# Patient Record
Sex: FEMALE | Race: WHITE | Employment: PART TIME | URBAN - METROPOLITAN AREA
[De-identification: names, ages, dates, MRNs, and addresses within clinical notes are randomized per-mention and may not be internally consistent; named-entity substitution may affect disease eponyms.]

---

## 2024-10-08 ENCOUNTER — APPOINTMENT (OUTPATIENT)
Dept: GENERAL RADIOLOGY | Facility: HOSPITAL | Age: 24
End: 2024-10-08
Attending: EMERGENCY MEDICINE

## 2024-10-08 ENCOUNTER — HOSPITAL ENCOUNTER (EMERGENCY)
Facility: HOSPITAL | Age: 24
Discharge: HOME | End: 2024-10-09
Attending: EMERGENCY MEDICINE

## 2024-10-08 ENCOUNTER — TELEPHONE (OUTPATIENT)
Dept: ENDOCRINOLOGY CLINIC | Facility: CLINIC | Age: 24
End: 2024-10-08

## 2024-10-08 DIAGNOSIS — E05.90 HYPERTHYROIDISM: Primary | ICD-10-CM

## 2024-10-08 LAB
ALBUMIN SERPL-MCNC: 4.2 G/DL (ref 3.2–4.8)
ALBUMIN/GLOB SERPL: 1.5 {RATIO} (ref 1–2)
ALP LIVER SERPL-CCNC: 198 U/L
ALT SERPL-CCNC: 52 U/L
ANION GAP SERPL CALC-SCNC: 6 MMOL/L (ref 0–18)
AST SERPL-CCNC: 46 U/L (ref ?–34)
B-HCG UR QL: NEGATIVE
BASOPHILS # BLD AUTO: 0.01 X10(3) UL (ref 0–0.2)
BASOPHILS NFR BLD AUTO: 0.2 %
BILIRUB SERPL-MCNC: 0.3 MG/DL (ref 0.3–1.2)
BILIRUB UR QL: NEGATIVE
BUN BLD-MCNC: 12 MG/DL (ref 9–23)
BUN/CREAT SERPL: 25.5 (ref 10–20)
CALCIUM BLD-MCNC: 9.5 MG/DL (ref 8.7–10.4)
CHLORIDE SERPL-SCNC: 110 MMOL/L (ref 98–112)
CO2 SERPL-SCNC: 24 MMOL/L (ref 21–32)
COLOR UR: YELLOW
CREAT BLD-MCNC: 0.47 MG/DL
DEPRECATED RDW RBC AUTO: 36.9 FL (ref 35.1–46.3)
EGFRCR SERPLBLD CKD-EPI 2021: 136 ML/MIN/1.73M2 (ref 60–?)
EOSINOPHIL # BLD AUTO: 0.05 X10(3) UL (ref 0–0.7)
EOSINOPHIL NFR BLD AUTO: 0.9 %
ERYTHROCYTE [DISTWIDTH] IN BLOOD BY AUTOMATED COUNT: 13.5 % (ref 11–15)
GLOBULIN PLAS-MCNC: 2.8 G/DL (ref 2–3.5)
GLUCOSE BLD-MCNC: 123 MG/DL (ref 70–99)
GLUCOSE UR-MCNC: NORMAL MG/DL
HCT VFR BLD AUTO: 33.6 %
HGB BLD-MCNC: 11 G/DL
IMM GRANULOCYTES # BLD AUTO: 0.01 X10(3) UL (ref 0–1)
IMM GRANULOCYTES NFR BLD: 0.2 %
LEUKOCYTE ESTERASE UR QL STRIP.AUTO: 25
LYMPHOCYTES # BLD AUTO: 2.7 X10(3) UL (ref 1–4)
LYMPHOCYTES NFR BLD AUTO: 47.3 %
MCH RBC QN AUTO: 24.9 PG (ref 26–34)
MCHC RBC AUTO-ENTMCNC: 32.7 G/DL (ref 31–37)
MCV RBC AUTO: 76 FL
MONOCYTES # BLD AUTO: 0.5 X10(3) UL (ref 0.1–1)
MONOCYTES NFR BLD AUTO: 8.8 %
NEUTROPHILS # BLD AUTO: 2.44 X10 (3) UL (ref 1.5–7.7)
NEUTROPHILS # BLD AUTO: 2.44 X10(3) UL (ref 1.5–7.7)
NEUTROPHILS NFR BLD AUTO: 42.6 %
NITRITE UR QL STRIP.AUTO: NEGATIVE
OSMOLALITY SERPL CALC.SUM OF ELEC: 291 MOSM/KG (ref 275–295)
PH UR: 5.5 [PH] (ref 5–8)
PLATELET # BLD AUTO: 314 10(3)UL (ref 150–450)
POTASSIUM SERPL-SCNC: 3.9 MMOL/L (ref 3.5–5.1)
PROT SERPL-MCNC: 7 G/DL (ref 5.7–8.2)
RBC # BLD AUTO: 4.42 X10(6)UL
SODIUM SERPL-SCNC: 140 MMOL/L (ref 136–145)
SP GR UR STRIP: 1.03 (ref 1–1.03)
T3FREE SERPL-MCNC: >20 PG/ML (ref 2.4–4.2)
T4 FREE SERPL-MCNC: 7 NG/DL (ref 0.8–1.7)
TSI SER-ACNC: 0.01 MIU/ML (ref 0.55–4.78)
UROBILINOGEN UR STRIP-ACNC: 3
WBC # BLD AUTO: 5.7 X10(3) UL (ref 4–11)

## 2024-10-08 PROCEDURE — 81001 URINALYSIS AUTO W/SCOPE: CPT

## 2024-10-08 PROCEDURE — 99284 EMERGENCY DEPT VISIT MOD MDM: CPT

## 2024-10-08 PROCEDURE — 87086 URINE CULTURE/COLONY COUNT: CPT | Performed by: EMERGENCY MEDICINE

## 2024-10-08 PROCEDURE — 81025 URINE PREGNANCY TEST: CPT

## 2024-10-08 PROCEDURE — 36415 COLL VENOUS BLD VENIPUNCTURE: CPT

## 2024-10-08 PROCEDURE — 99285 EMERGENCY DEPT VISIT HI MDM: CPT

## 2024-10-08 PROCEDURE — 84481 FREE ASSAY (FT-3): CPT | Performed by: EMERGENCY MEDICINE

## 2024-10-08 PROCEDURE — 71045 X-RAY EXAM CHEST 1 VIEW: CPT | Performed by: EMERGENCY MEDICINE

## 2024-10-08 PROCEDURE — 87086 URINE CULTURE/COLONY COUNT: CPT

## 2024-10-08 PROCEDURE — 80053 COMPREHEN METABOLIC PANEL: CPT | Performed by: EMERGENCY MEDICINE

## 2024-10-08 PROCEDURE — 80053 COMPREHEN METABOLIC PANEL: CPT

## 2024-10-08 PROCEDURE — 81001 URINALYSIS AUTO W/SCOPE: CPT | Performed by: EMERGENCY MEDICINE

## 2024-10-08 PROCEDURE — 93005 ELECTROCARDIOGRAM TRACING: CPT

## 2024-10-08 PROCEDURE — 85025 COMPLETE CBC W/AUTO DIFF WBC: CPT | Performed by: EMERGENCY MEDICINE

## 2024-10-08 PROCEDURE — 93010 ELECTROCARDIOGRAM REPORT: CPT

## 2024-10-08 PROCEDURE — 84443 ASSAY THYROID STIM HORMONE: CPT | Performed by: EMERGENCY MEDICINE

## 2024-10-08 PROCEDURE — 85025 COMPLETE CBC W/AUTO DIFF WBC: CPT

## 2024-10-08 PROCEDURE — 84439 ASSAY OF FREE THYROXINE: CPT | Performed by: EMERGENCY MEDICINE

## 2024-10-08 RX ORDER — METOPROLOL TARTRATE 50 MG
50 TABLET ORAL 2 TIMES DAILY
COMMUNITY

## 2024-10-08 RX ORDER — METOPROLOL TARTRATE 50 MG
50 TABLET ORAL ONCE
Status: COMPLETED | OUTPATIENT
Start: 2024-10-08 | End: 2024-10-08

## 2024-10-09 VITALS
DIASTOLIC BLOOD PRESSURE: 78 MMHG | OXYGEN SATURATION: 100 % | RESPIRATION RATE: 28 BRPM | SYSTOLIC BLOOD PRESSURE: 122 MMHG | WEIGHT: 143.31 LBS | HEART RATE: 107 BPM | TEMPERATURE: 99 F

## 2024-10-09 LAB
ATRIAL RATE: 128 BPM
P AXIS: 50 DEGREES
P-R INTERVAL: 128 MS
Q-T INTERVAL: 302 MS
QRS DURATION: 78 MS
QTC CALCULATION (BEZET): 440 MS
R AXIS: 60 DEGREES
T AXIS: 32 DEGREES
VENTRICULAR RATE: 128 BPM

## 2024-10-09 RX ORDER — METHIMAZOLE 10 MG/1
20 TABLET ORAL 2 TIMES DAILY
Qty: 120 TABLET | Refills: 0 | Status: SHIPPED | OUTPATIENT
Start: 2024-10-09 | End: 2024-11-08

## 2024-10-09 RX ORDER — METOPROLOL TARTRATE 50 MG
50 TABLET ORAL 2 TIMES DAILY
Qty: 60 TABLET | Refills: 0 | Status: SHIPPED | OUTPATIENT
Start: 2024-10-09 | End: 2024-11-08

## 2024-10-09 NOTE — ED PROVIDER NOTES
Patient Seen in: [facility] Emergency Department      History     Chief Complaint   Patient presents with    Abdomen/Flank Pain    Arrythmia/Palpitations     Stated Complaint: abd pain, fever, N/V/D    Subjective:   HPI    24-year-old female with history of recently diagnosed hyperthyroidism presents with complaints of nausea, vomiting, diarrhea, abdominal pain, subjective fever, palpitations, and difficulty sleeping.  The patient reports symptoms over the past 2 weeks.  She was hospitalized in [place] in mid June and was diagnosed with hyperthyroidism.  She was discharged home on metoprolol 50 mg twice daily.  She has not been taking the medication regularly as she states it causes insomnia.  She complains of diffuse abdominal pain along with intermittent vomiting and diarrhea.  No measured fevers at home.  She denies any cough or dyspnea.  History is obtained through a language line  as the patient does not speak English.    Objective:     Past Medical History:    Hyperthyroidism    Tachycardia              History reviewed. No pertinent surgical history.             Social History     Socioeconomic History    Marital status: Single   Tobacco Use    Smoking status: Never    Smokeless tobacco: Never   Substance and Sexual Activity    Alcohol use: Never    Drug use: Never                  Physical Exam     ED Triage Vitals [10/08/24 2119]   /85   Pulse (!) 123   Resp 18   Temp 98.5 °F (36.9 °C)   Temp src Oral   SpO2 98 %   O2 Device None (Room air)       Current Vitals:   Vital Signs  BP: 135/85  Pulse: 111  Resp: 25  Temp: 98.5 °F (36.9 °C)  Temp src: Oral    Oxygen Therapy  SpO2: 99 %  O2 Device: None (Room air)        Physical Exam    General Appearance: alert, no distress  Eyes: pupils equal and round no pallor or injection.  Mild exophthalmos noted.  ENT, Mouth: mucous membranes moist  Respiratory: there are no retractions, lungs are clear to auscultation  Cardiovascular:  Tachycardic, regular rhythm  Gastrointestinal:  abdomen is soft with mild, diffuse tenderness to palpation, no masses, bowel sounds normal  Neurological: Speech normal.  Moving extremities x 4.  Skin: warm and dry, no rashes.  Musculoskeletal: neck is supple non tender        Extremities are symmetrical, full range of motion.  No leg edema or tenderness noted.  Psychiatric: patient is oriented X 3, there is no agitation    DIFFERENTIAL DIAGNOSIS: After history and physical exam differential diagnosis was considered for thyrotoxicosis, thyroid storm, or other        ED Course     Labs Reviewed   URINALYSIS WITH CULTURE REFLEX - Abnormal; Notable for the following components:       Result Value    Clarity Urine Turbid (*)     Ketones Urine Trace (*)     Blood Urine Trace (*)     Protein Urine Trace (*)     Urobilinogen Urine 3 (*)     Leukocyte Esterase Urine 25 (*)     Squamous Epi. Cells Few (*)     All other components within normal limits   COMP METABOLIC PANEL (14) - Abnormal; Notable for the following components:    Glucose 123 (*)     Creatinine 0.47 (*)     BUN/CREA Ratio 25.5 (*)     ALT 52 (*)     AST 46 (*)     Alkaline Phosphatase 198 (*)     All other components within normal limits   CBC WITH DIFFERENTIAL WITH PLATELET - Abnormal; Notable for the following components:    HGB 11.0 (*)     HCT 33.6 (*)     MCV 76.0 (*)     MCH 24.9 (*)     All other components within normal limits   TSH W REFLEX TO FREE T4 - Abnormal; Notable for the following components:    TSH 0.009 (*)     All other components within normal limits   T4, FREE (S) - Abnormal; Notable for the following components:    Free T4 7.0 (*)     All other components within normal limits   FREE T3 (TRIIODOTHYRONINE) - Abnormal; Notable for the following components:    T3 Free >20.00 (*)     All other components within normal limits   POCT PREGNANCY URINE - Normal   URINE CULTURE, ROUTINE     EKG    Rate, intervals and axes as noted on EKG  Report.  Rate: 128  Rhythm: Sinus Rhythm  Axis: Normal  Reading: Sinus tachycardia, otherwise normal EKG                       MDM      Heart rate improved post metoprolol.  Patient remains comfortable appearing.    Chest x-ray was reviewed independently by me.  No pulmonary edema noted.  Lab results noted.  Discussed with Dr. John, endocrinology.  She recommends continuing metoprolol and adding methimazole as prescribed.  She will follow-up with the patient and her clinic for further treatment.  Will also give primary care follow-up.  The patient is advised to return if worsening symptoms develop.                    Medical Decision Making      Disposition and Plan     Clinical Impression:  1. Hyperthyroidism         Disposition:  Discharge  10/9/2024 12:14 am    Follow-up:  Keren John MD  [address] [address]  [phone]    Follow up      Colin Underwood MD  [address] [id number] [phone]    Follow up            Medications Prescribed:  Current Discharge Medication List        START taking these medications    Details   methIMAzole 10 MG Oral Tab Take 2 tablets (20 mg total) by mouth in the morning and 2 tablets (20 mg total) before bedtime.  Qty: 120 tablet, Refills: 0      !! metoprolol tartrate 50 MG Oral Tab Take 1 tablet (50 mg total) by mouth 2 (two) times daily.  Qty: 60 tablet, Refills: 0       !! - Potential duplicate medications found. Please discuss with provider.              Supplementary Documentation:

## 2024-10-09 NOTE — TELEPHONE ENCOUNTER
Hi!  Please contact patient and set up new consult in 6 weeks with repeat labs to be completed before appt. It should be a 30 minute appt if possible. Thank you!

## 2024-10-09 NOTE — ED INITIAL ASSESSMENT (HPI)
Pt presents to ED with multiple complaints. Pt  c/o of abdominal  pain x 15 days, palpitations, loss of appetite, and nauseas . Pt Denies CP.   Pt states she was in a hospital in [place] and was diagnosed with hyperthyroidism and tachycardia and was prescribed metoprolol. Pt states she is taking her meds but not as she should because it gives her insomnia.   Pt states she also had a  + pregnancy  test at home.

## 2024-10-09 NOTE — ED QUICK NOTES
Patient approved for discharge per emergency department provider. patient given verbal and written discharge instructions. Given instructions on rx x 2, follow up with endocrinology, and symptoms that necessitate coming back to the emergency department. Verbalizes understanding of discharge instructions.     Patient aox 3 out of ED with steady gait. Resp unlabored

## 2024-10-09 NOTE — DISCHARGE INSTRUCTIONS
Continue metoprolol as previously prescribed.  Take methimazole as prescribed.  Follow-up with endocrinology as discussed.  Return to the emergency department if repeated vomiting, worsening palpitations, difficulty breathing, or other new symptoms develop.

## 2024-10-10 NOTE — TELEPHONE ENCOUNTER
Spoke with Dinora, pt's life partner, pt is scheduled for 11/21/2024 at 915 am in for thyroid consult and message was left for pt to have repeat blood work before appointment. Dinora understood and address and location was confirmed and they had no additional questions or concerns at this time.

## 2024-11-21 ENCOUNTER — OFFICE VISIT (OUTPATIENT)
Dept: ENDOCRINOLOGY CLINIC | Facility: CLINIC | Age: 24
End: 2024-11-21

## 2024-11-21 ENCOUNTER — TELEPHONE (OUTPATIENT)
Dept: ENDOCRINOLOGY CLINIC | Facility: CLINIC | Age: 24
End: 2024-11-21

## 2024-11-21 VITALS
WEIGHT: 160 LBS | HEART RATE: 135 BPM | SYSTOLIC BLOOD PRESSURE: 134 MMHG | HEIGHT: 62.99 IN | BODY MASS INDEX: 28.35 KG/M2 | DIASTOLIC BLOOD PRESSURE: 91 MMHG

## 2024-11-21 DIAGNOSIS — Z34.90 PREGNANCY, UNSPECIFIED GESTATIONAL AGE (HCC): ICD-10-CM

## 2024-11-21 DIAGNOSIS — E05.90 HYPERTHYROIDISM: Primary | ICD-10-CM

## 2024-11-21 PROCEDURE — 99244 OFF/OP CNSLTJ NEW/EST MOD 40: CPT | Performed by: INTERNAL MEDICINE

## 2024-11-21 NOTE — H&P
Name: Latia Cross  Date: 11/21/2024    Referring Physician: No ref. provider found    Chief Complaint   Patient presents with    Consult     Hyperthyroidism, last labs        HISTORY OF PRESENT ILLNESS   Latia Cross is a 24 year old female who presents for   Chief Complaint   Patient presents with    Consult     Hyperthyroidism, last labs      This is a 24 year-old woman here for evaluation and management of hyperthyroidism. She had come to the ER about 6 weeks ago with signs of tachycardia, anxiety, and menstrual irregularities.     Throat Pain- none now, she did have this before    Family history of thyroid cancer/ conditions- none  Personal history of radiation to the head or neck- none    Medical History:   Past Medical History:    Hyperthyroidism    Tachycardia       Surgical History:   No past surgical history on file.    Family History:  No family history on file.    Social History:   Social History     Socioeconomic History    Marital status: Single   Tobacco Use    Smoking status: Never    Smokeless tobacco: Never   Vaping Use    Vaping status: Never Used   Substance and Sexual Activity    Alcohol use: Never    Drug use: Never       Medications:     Current Outpatient Medications:     metoprolol tartrate 50 MG Oral Tab, Take 1 tablet (50 mg total) by mouth 2 (two) times daily., Disp: , Rfl:      Allergies:   Allergies[1]    REVIEW OF SYSTEMS  Eyes: no change in vision  Neurologic: no headache, generalized or focal weakness or numbness.  Head: normal  ENT: normal  Lungs: no shortness of breath, wheezing or ABDUL  Cardiovascular:  no chest pain or palpitations  Gastrointestinal:  no abdominal pain, bowel movement problems  Musculoskeletal: no muscle pain or arthralgia  /Gyne: no frequency or discomfort while urinating  Psychiatric:  no acute distress, anxiety  or depression  Skin: normal moisturized skin    PHYSICAL EXAMINATION:  BP (!) 134/91   Pulse (!) 135   Ht 5' 2.99\"  (1.6 m)   Wt 160 lb (72.6 kg)   LMP  (LMP Unknown)   BMI 28.35 kg/m²     General Appearance:  Alert, in no acute distress, well developed  Eyes: normal conjunctivae, sclera.  Ears/Nose/Mouth/Throat/Neck:  normal hearing, normal speech and normal thyroid gland  Neck: Trachea midline  Neurologic: sensory grossly intact and motor grossly intact  Psychiatric:  oriented to time, self, and place  Nutritional:  no abnormal weight gain or loss    Labs:  Date  TSH  FT4 FT3    10/08/24 0.009  7.0 >20.00      Imaging:  None    ASSESSMENT/PLAN:  This is a 24 year-old woman here for evaluation and management of hyperthyroidism, cause unknown. She states that she is pregnant.    1. 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    -  Patient is aware of the following:  They will need life long replacement with levothyroxine after surgery and possibly after AUGUST  AUGUST: Risks/benefits explained to pt and questions answered. Patient is not pregnant, breastfeeding. No renal or hepatic insufficiency.  Pt understands the following risks:  - Risk for thyroiditis (1% of cases). if mild neck pain, pt to take Ibuprofen OTC; if severe pain, swelling, CP, or SOB, pt to go to ER.  - Risk for hypothyroidism: Most pts who receive radioactive iodine become hypothyroid and will need to take thyroid hormone supplements for the rest of their lives.  - Risk of recurrence of disease explained and may need further treatment; about 20% of those who receive AUGUST require a 2nd dose.  - Risk of worsening ophthalmopathy explained and instructed to immediately come to the clinic.  - Pt to avoid close physical contact, especially w/ young children and pregnant women for at least 3 days after AUGUST  treatment due to the possibility of exposing them to low doses of radiation.   - Pt instructed and knows the risk to pregnancy for the next 6 months after receiving AUGUST.    ATD:  Stop MMI  Continue metoprolol    - Side effects discussed:  > pruritus, rash, urticaria, arthralgias, arthritis, fever, abnormal taste sensation, nausea, or vomiting in up to 13 percent of patients   > agranulocytosis (prevalence of 0.1 to 0.5 %, and usually occurs within the first two months of treatment, but may occur later)  > congenital malformations associated with methimazole : fetal scalp defect, aplasia cutis , choanal atresia, tracheoesophageal fistulas   > Hepatotoxicity  Discussed and the patient knows that she should stop MMI and come to the ER/call the office if she experiences sore throat, fever, jaundice, dark urine, light stools, abdominal pain, anorexia, nausea, rash or joint pains,  - Patient understands that she needs 18 months of therapy with methimazole and that the frequency of prolonged remission among patients treated with methimazole for one to two years varies from 15 to 80 percent, but is usually 20 to 30 percent in the [place] and is more likely in patients with milder disease. During treatment, the dose will need to be adjusted  every 2 to 3 months to avoid hyperthyrroidism/hypothyroidism  - Patient doesn’t smoke/Smoking cessation discussed. Patient understands that smoking can aggravate orbitopathy    - Check TSH, FT4, FT3, CMP, CBC with diff, TSI and pregnancy test and start PTU according to current test results    Return to clinic in 6 weeks    Prior to this encounter, I spent over 20 minutes with preparing for the visit, reviewing documents from other specialties as well as from PCP, and going over test results and imaging studies. During the face to face encounter, I spent an additional 30 minutes which were determined for history-taking, physical examination, and orientation regarding our services.  Greater than 50% of the time was spent in counseling, anticipatory guidance, and coordination of care. Patient concerns were answered to the best of my knowledge.         11/21/2024  Keren John MD         [1] No Known Allergies

## 2024-11-21 NOTE — TELEPHONE ENCOUNTER
ALEXIS via  Jeremiah #[phone] to call clinic to relay message:  Dr. John has ordered labs to be completed as soon as possible - let us know if using another lab so orders can be faxed there  Patient does not have insurance - can contact [facility] (phone #[phone]): We provide high quality primary medical, dental, and behavioral healthcare to all, regardless of ability to pay or immigration status.       EC phone # does not have voicemail set up

## 2025-01-12 ENCOUNTER — APPOINTMENT (OUTPATIENT)
Dept: CT IMAGING | Facility: HOSPITAL | Age: 25
End: 2025-01-12
Attending: EMERGENCY MEDICINE

## 2025-01-12 ENCOUNTER — HOSPITAL ENCOUNTER (EMERGENCY)
Facility: HOSPITAL | Age: 25
Discharge: HOME | End: 2025-01-12
Attending: EMERGENCY MEDICINE

## 2025-01-12 VITALS
SYSTOLIC BLOOD PRESSURE: 122 MMHG | HEIGHT: 63 IN | TEMPERATURE: 98 F | BODY MASS INDEX: 26.58 KG/M2 | WEIGHT: 150 LBS | HEART RATE: 105 BPM | RESPIRATION RATE: 18 BRPM | DIASTOLIC BLOOD PRESSURE: 53 MMHG | OXYGEN SATURATION: 97 %

## 2025-01-12 DIAGNOSIS — J98.59 MEDIASTINAL MASS: ICD-10-CM

## 2025-01-12 DIAGNOSIS — G89.29 CHRONIC NONINTRACTABLE HEADACHE, UNSPECIFIED HEADACHE TYPE: ICD-10-CM

## 2025-01-12 DIAGNOSIS — R51.9 CHRONIC NONINTRACTABLE HEADACHE, UNSPECIFIED HEADACHE TYPE: ICD-10-CM

## 2025-01-12 DIAGNOSIS — L25.9 CONTACT DERMATITIS, UNSPECIFIED CONTACT DERMATITIS TYPE, UNSPECIFIED TRIGGER: Primary | ICD-10-CM

## 2025-01-12 DIAGNOSIS — R04.0 EPISTAXIS: ICD-10-CM

## 2025-01-12 LAB
ALBUMIN SERPL-MCNC: 4.4 G/DL (ref 3.2–4.8)
ALBUMIN/GLOB SERPL: 1.3 {RATIO} (ref 1–2)
ALP LIVER SERPL-CCNC: 270 U/L
ALT SERPL-CCNC: 58 U/L
ANION GAP SERPL CALC-SCNC: 9 MMOL/L (ref 0–18)
AST SERPL-CCNC: 53 U/L (ref ?–34)
B-HCG UR QL: NEGATIVE
BASOPHILS # BLD AUTO: 0.01 X10(3) UL (ref 0–0.2)
BASOPHILS NFR BLD AUTO: 0.1 %
BILIRUB SERPL-MCNC: 0.4 MG/DL (ref 0.3–1.2)
BILIRUB UR QL: NEGATIVE
BUN BLD-MCNC: 18 MG/DL (ref 9–23)
BUN/CREAT SERPL: 52.9 (ref 10–20)
CALCIUM BLD-MCNC: 9.9 MG/DL (ref 8.7–10.4)
CHLORIDE SERPL-SCNC: 108 MMOL/L (ref 98–112)
CO2 SERPL-SCNC: 24 MMOL/L (ref 21–32)
COLOR UR: YELLOW
CREAT BLD-MCNC: 0.34 MG/DL
DEPRECATED RDW RBC AUTO: 36.5 FL (ref 35.1–46.3)
EGFRCR SERPLBLD CKD-EPI 2021: 147 ML/MIN/1.73M2 (ref 60–?)
EOSINOPHIL # BLD AUTO: 0.07 X10(3) UL (ref 0–0.7)
EOSINOPHIL NFR BLD AUTO: 0.9 %
ERYTHROCYTE [DISTWIDTH] IN BLOOD BY AUTOMATED COUNT: 13.6 % (ref 11–15)
GLOBULIN PLAS-MCNC: 3.5 G/DL (ref 2–3.5)
GLUCOSE BLD-MCNC: 121 MG/DL (ref 70–99)
GLUCOSE UR-MCNC: NORMAL MG/DL
HCT VFR BLD AUTO: 38.3 %
HGB BLD-MCNC: 12.2 G/DL
IMM GRANULOCYTES # BLD AUTO: 0.01 X10(3) UL (ref 0–1)
IMM GRANULOCYTES NFR BLD: 0.1 %
LEUKOCYTE ESTERASE UR QL STRIP.AUTO: 75
LYMPHOCYTES # BLD AUTO: 4 X10(3) UL (ref 1–4)
LYMPHOCYTES NFR BLD AUTO: 51.7 %
MCH RBC QN AUTO: 24.1 PG (ref 26–34)
MCHC RBC AUTO-ENTMCNC: 31.9 G/DL (ref 31–37)
MCV RBC AUTO: 75.7 FL
MONOCYTES # BLD AUTO: 0.65 X10(3) UL (ref 0.1–1)
MONOCYTES NFR BLD AUTO: 8.4 %
NEUTROPHILS # BLD AUTO: 3 X10 (3) UL (ref 1.5–7.7)
NEUTROPHILS # BLD AUTO: 3 X10(3) UL (ref 1.5–7.7)
NEUTROPHILS NFR BLD AUTO: 38.8 %
NITRITE UR QL STRIP.AUTO: NEGATIVE
OSMOLALITY SERPL CALC.SUM OF ELEC: 295 MOSM/KG (ref 275–295)
PH UR: 5.5 [PH] (ref 5–8)
PLATELET # BLD AUTO: 403 10(3)UL (ref 150–450)
POTASSIUM SERPL-SCNC: 4.3 MMOL/L (ref 3.5–5.1)
PROT SERPL-MCNC: 7.9 G/DL (ref 5.7–8.2)
PROT UR-MCNC: 30 MG/DL
RBC # BLD AUTO: 5.06 X10(6)UL
SODIUM SERPL-SCNC: 141 MMOL/L (ref 136–145)
SP GR UR STRIP: >1.03 (ref 1–1.03)
T4 FREE SERPL-MCNC: 5.8 NG/DL (ref 0.8–1.7)
TSI SER-ACNC: <0.01 UIU/ML (ref 0.55–4.78)
UROBILINOGEN UR STRIP-ACNC: 2
WBC # BLD AUTO: 7.7 X10(3) UL (ref 4–11)

## 2025-01-12 PROCEDURE — 84439 ASSAY OF FREE THYROXINE: CPT | Performed by: EMERGENCY MEDICINE

## 2025-01-12 PROCEDURE — 99285 EMERGENCY DEPT VISIT HI MDM: CPT

## 2025-01-12 PROCEDURE — 96374 THER/PROPH/DIAG INJ IV PUSH: CPT

## 2025-01-12 PROCEDURE — S0028 INJECTION, FAMOTIDINE, 20 MG: HCPCS | Performed by: EMERGENCY MEDICINE

## 2025-01-12 PROCEDURE — 70498 CT ANGIOGRAPHY NECK: CPT | Performed by: EMERGENCY MEDICINE

## 2025-01-12 PROCEDURE — 70496 CT ANGIOGRAPHY HEAD: CPT | Performed by: EMERGENCY MEDICINE

## 2025-01-12 PROCEDURE — 84443 ASSAY THYROID STIM HORMONE: CPT | Performed by: EMERGENCY MEDICINE

## 2025-01-12 PROCEDURE — 99284 EMERGENCY DEPT VISIT MOD MDM: CPT

## 2025-01-12 PROCEDURE — 81025 URINE PREGNANCY TEST: CPT

## 2025-01-12 PROCEDURE — 81001 URINALYSIS AUTO W/SCOPE: CPT | Performed by: EMERGENCY MEDICINE

## 2025-01-12 PROCEDURE — 96361 HYDRATE IV INFUSION ADD-ON: CPT

## 2025-01-12 PROCEDURE — 96375 TX/PRO/DX INJ NEW DRUG ADDON: CPT

## 2025-01-12 PROCEDURE — 80053 COMPREHEN METABOLIC PANEL: CPT | Performed by: EMERGENCY MEDICINE

## 2025-01-12 PROCEDURE — 85025 COMPLETE CBC W/AUTO DIFF WBC: CPT | Performed by: EMERGENCY MEDICINE

## 2025-01-12 RX ORDER — PREDNISONE 20 MG/1
40 TABLET ORAL DAILY
Qty: 14 TABLET | Refills: 0 | Status: SHIPPED | OUTPATIENT
Start: 2025-01-12 | End: 2025-01-19

## 2025-01-12 RX ORDER — METOCLOPRAMIDE HYDROCHLORIDE 5 MG/ML
5 INJECTION INTRAMUSCULAR; INTRAVENOUS ONCE
Status: COMPLETED | OUTPATIENT
Start: 2025-01-12 | End: 2025-01-12

## 2025-01-12 RX ORDER — HYDROXYZINE HYDROCHLORIDE 25 MG/1
TABLET, FILM COATED ORAL NIGHTLY PRN
Qty: 14 TABLET | Refills: 0 | Status: SHIPPED | OUTPATIENT
Start: 2025-01-12

## 2025-01-12 RX ORDER — FAMOTIDINE 10 MG/ML
20 INJECTION, SOLUTION INTRAVENOUS ONCE
Status: COMPLETED | OUTPATIENT
Start: 2025-01-12 | End: 2025-01-12

## 2025-01-12 RX ORDER — FAMOTIDINE 20 MG/1
20 TABLET, FILM COATED ORAL 2 TIMES DAILY PRN
Qty: 30 TABLET | Refills: 0 | Status: SHIPPED | OUTPATIENT
Start: 2025-01-12 | End: 2025-02-11

## 2025-01-12 RX ORDER — METHYLPREDNISOLONE SODIUM SUCCINATE 125 MG/2ML
125 INJECTION INTRAMUSCULAR; INTRAVENOUS ONCE
Status: COMPLETED | OUTPATIENT
Start: 2025-01-12 | End: 2025-01-12

## 2025-01-12 RX ORDER — LORATADINE 10 MG/1
10 TABLET ORAL DAILY
Qty: 7 TABLET | Refills: 0 | Status: SHIPPED | OUTPATIENT
Start: 2025-01-12 | End: 2025-01-19

## 2025-01-12 RX ORDER — DIPHENHYDRAMINE HYDROCHLORIDE 50 MG/ML
25 INJECTION INTRAMUSCULAR; INTRAVENOUS ONCE
Status: COMPLETED | OUTPATIENT
Start: 2025-01-12 | End: 2025-01-12

## 2025-01-12 NOTE — ED INITIAL ASSESSMENT (HPI)
Patient arrives to the ED with c/o rash that started 2 weeks ago. States she's also been having on and off nose bleeds and headaches for months. Hx thyroid disease and tachycardia.

## 2025-01-13 NOTE — ED QUICK NOTES
Language line #[id number] used by MD and RN for discharge instructions. Reviewed actions and side effects of discharge medication and importance of MD follow up. Pt verbalized understanding.

## 2025-01-13 NOTE — DISCHARGE INSTRUCTIONS
Prednisone as prescribed. Use a humidifier in your room to help prevent nosebleeds. Atarax for itching at night and to aid in sleep. Claritin daily for the next week. Pepcid twice a day for the next 7 days.    Your CT shows no aneurysm or abnormality in brain tissue. There is fullness in the area of your upper chest behind your sternum which may just be residual thymus tissue but you will likely need further testing to confirm. You can see any of the Dr's above to help coordinate follow up testing.

## 2025-01-13 NOTE — ED PROVIDER NOTES
Patient Seen in: [facility] Emergency Department      History     Chief Complaint   Patient presents with    Nose Bleed    Rash Skin Problem     Stated Complaint: HA & epistaxis, allergic rxn/rash to whole body x1 week    Subjective:   HPI      Of note, all interactions with the patient including history taking, physical exam and discharge instructions were performed with the assistance of telemetry language line .    Patient presents the emergency department with multiple complaints.  She states that she has had a rash on her hands and her feet which is progressed over the last several weeks now to whole body itching.  She is unsure of an exposure.  She does use soap at work which she thinks she may be allergic to.  She also has had headaches for greater than a year which have been escalating as time is gone by most specifically over the last month.  There is no fever, visual disturbance, neck pain or neck stiffness.  There is no other aggravating or alleviating factors.  She describes in the diffuse and also at the base of her skull.  There is no weakness or numbness in arms or legs.  She also states that she intermittently gets nosebleeds specifically when she inserts her fingers in her nose.  She cannot recall the last time she had a nosebleed.  She has a history of tachycardia and hyperthyroidism for which she takes medication.    Objective:     Past Medical History:    Hyperthyroidism    Tachycardia              History reviewed. No pertinent surgical history.             Social History     Socioeconomic History    Marital status: Single   Tobacco Use    Smoking status: Never    Smokeless tobacco: Never   Vaping Use    Vaping status: Never Used   Substance and Sexual Activity    Alcohol use: Never    Drug use: Never                  Physical Exam     ED Triage Vitals [01/12/25 1754]   /78   Pulse (!) 122   Resp 18   Temp 98.4 °F (36.9 °C)   Temp src Oral   SpO2 98 %   O2 Device None  (Room air)       Current Vitals:   Vital Signs  BP: 122/53  Pulse: 105  Resp: 18  Temp: 98.4 °F (36.9 °C)  Temp src: Oral  MAP (mmHg): 70    Oxygen Therapy  SpO2: 97 %  O2 Device: None (Room air)        Physical Exam  Vitals and nursing note reviewed.   Constitutional:       General: She is not in acute distress.     Appearance: She is well-developed.   HENT:      Head: Normocephalic.      Right Ear: Tympanic membrane and ear canal normal.      Left Ear: Tympanic membrane and ear canal normal.      Nose: Nose normal.      Mouth/Throat:      Mouth: Mucous membranes are moist.   Eyes:      Conjunctiva/sclera: Conjunctivae normal.   Cardiovascular:      Rate and Rhythm: Normal rate and regular rhythm.      Heart sounds: No murmur heard.  Pulmonary:      Effort: Pulmonary effort is normal. No respiratory distress.      Breath sounds: Normal breath sounds.   Abdominal:      General: There is no distension.      Palpations: Abdomen is soft.      Tenderness: There is no abdominal tenderness.   Musculoskeletal:         General: No tenderness. Normal range of motion.      Cervical back: Normal range of motion and neck supple. No rigidity or tenderness.   Skin:     Capillary Refill: Capillary refill takes less than 2 seconds.      Comments: There is a generalized rash worse over the hands and feet with maculopapular component but no petechiae or purpura.   Neurological:      General: No focal deficit present.      Mental Status: She is alert and oriented to person, place, and time.      Cranial Nerves: No cranial nerve deficit.      Sensory: No sensory deficit.      Motor: No weakness.      Coordination: Coordination normal.      Gait: Gait normal.      Deep Tendon Reflexes: Reflexes normal.             ED Course     Labs Reviewed   CBC WITH DIFFERENTIAL WITH PLATELET - Abnormal; Notable for the following components:       Result Value    MCV 75.7 (*)     MCH 24.1 (*)     All other components within normal limits   COMP  METABOLIC PANEL (14) - Abnormal; Notable for the following components:    Glucose 121 (*)     Creatinine 0.34 (*)     BUN/CREA Ratio 52.9 (*)     ALT 58 (*)     AST 53 (*)     Alkaline Phosphatase 270 (*)     All other components within normal limits   URINALYSIS, ROUTINE - Abnormal; Notable for the following components:    Clarity Urine Turbid (*)     Spec Gravity >1.030 (*)     Ketones Urine Trace (*)     Blood Urine Trace (*)     Protein Urine 30 (*)     Urobilinogen Urine 2 (*)     Leukocyte Esterase Urine 75 (*)     WBC Urine 11-20 (*)     RBC Urine 6-10 (*)     Squamous Epi. Cells Moderate (*)     All other components within normal limits   TSH W REFLEX TO FREE T4 - Abnormal; Notable for the following components:    TSH <0.010 (*)     All other components within normal limits   T4, FREE (S) - Abnormal; Notable for the following components:    Free T4 5.8 (*)     All other components within normal limits   POCT PREGNANCY URINE - Normal   RAINBOW DRAW BLUE   RAINBOW DRAW GOLD                   [facility]              Medical Decision Making  Differential diagnosis considered for aneurysm, brain tumor, contact dermatitis, allergic reaction, sinusitis.    Problems Addressed:  Chronic nonintractable headache, unspecified headache type: acute illness or injury  Contact dermatitis, unspecified contact dermatitis type, unspecified trigger: acute illness or injury  Epistaxis: acute illness or injury  Mediastinal mass: acute illness or injury    Amount and/or Complexity of Data Reviewed  Labs: ordered. Decision-making details documented in ED Course.     Details: TSH undetectable but patient is presently taking medication for hyperthyroidism.  CBC and chemistry panel unremarkable.  Radiology: ordered and independent interpretation performed. Decision-making details documented in ED Course.     Details: CTA of the brain and neck shows no evidence of acute occlusion or mass.  No bleeding.  There is a posterior mediastinal mass  that may be thymic residual tissue  Discussion of management or test interpretation with external provider(s): Will treat for allergic reaction with prednisone, Claritin, Pepcid and Atarax.  Recommend follow-up with endocrinology for further evaluation of mediastinal mass.    Risk  Prescription drug management.        Disposition and Plan     Clinical Impression:  1. Contact dermatitis, unspecified contact dermatitis type, unspecified trigger    2. Chronic nonintractable headache, unspecified headache type    3. Epistaxis    4. Mediastinal mass         Disposition:  Discharge  1/12/2025  8:54 pm    Follow-up:  Reji Tee MD  [address]  [address]  [facility] [address] [phone]    Follow up      Courtney Figueroa MD  [address]  [facility] [id number] [phone]    Schedule an appointment as soon as possible for a visit      Gloria Chauhan MD  [address]  [address]  [facility] [id number] [phone]    Schedule an appointment as soon as possible for a visit            Medications Prescribed:  Discharge Medication List as of 1/12/2025  8:55 PM        START taking these medications    Details   predniSONE 20 MG Oral Tab Take 2 tablets (40 mg total) by mouth daily for 7 days., Normal, Disp-14 tablet, R-0      famotidine (PEPCID) 20 MG Oral Tab Take 1 tablet (20 mg total) by mouth 2 (two) times daily as needed for Heartburn., Normal, Disp-30 tablet, R-0      loratadine 10 MG Oral Tab Take 1 tablet (10 mg total) by mouth daily for 7 days., Normal, Disp-7 tablet, R-0      hydrOXYzine 25 MG Oral Tab Take 1-2 tablets (25-50 mg total) by mouth nightly as needed for Itching (or aid in sleep)., Normal, Disp-14 tablet, R-0                 Supplementary Documentation:

## (undated) NOTE — LETTER
Date & Time: 1/12/2025, 9:06 PM  Patient: Latia Cross  Encounter Provider(s):    Dennis Howard MD       To Whom It May Concern:    Latia Cross was seen and treated in our department on 1/12/2025. She can return to work 1-.    If you have any questions or concerns, please do not hesitate to call.        _____________________________  Physician/APC Signature